# Patient Record
Sex: FEMALE | Race: WHITE | URBAN - METROPOLITAN AREA
[De-identification: names, ages, dates, MRNs, and addresses within clinical notes are randomized per-mention and may not be internally consistent; named-entity substitution may affect disease eponyms.]

---

## 2021-02-10 ENCOUNTER — IMPORTED ENCOUNTER (OUTPATIENT)
Dept: URBAN - METROPOLITAN AREA CLINIC 50 | Facility: CLINIC | Age: 61
End: 2021-02-10

## 2021-02-10 NOTE — PATIENT DISCUSSION
"""Informed patient that their cataract(s) are not visually significant or do not meet the criteria for cataract surgery.  Recommended attention to common cataract symptoms vomiting

## 2021-03-23 ENCOUNTER — IMPORTED ENCOUNTER (OUTPATIENT)
Dept: URBAN - METROPOLITAN AREA CLINIC 50 | Facility: CLINIC | Age: 61
End: 2021-03-23

## 2021-03-23 NOTE — PATIENT DISCUSSION
"""Reviewed proper use and contact lens care.  Discussed possible risks associated with contact lens ""

## 2021-04-08 ENCOUNTER — IMPORTED ENCOUNTER (OUTPATIENT)
Dept: URBAN - METROPOLITAN AREA CLINIC 50 | Facility: CLINIC | Age: 61
End: 2021-04-08

## 2021-04-17 ASSESSMENT — TONOMETRY
OS_IOP_MMHG: 16
OD_IOP_MMHG: 17

## 2021-04-17 ASSESSMENT — VISUAL ACUITY
OS_OTHER: 20/30. 20/30.
OS_BAT: 20/30
OD_BAT: 20/30
OD_OTHER: 20/30. 20/30.
OD_CC: J1+
OS_CC: J1+
OS_CC: 20/20
OD_CC: 20/20

## 2022-10-11 NOTE — PATIENT DISCUSSION
"""Call if vision decreases or RD warning signs increases/RD warnings given. No signs of retinal tear. Retinal detachment precautions discussed.  """ Yes